# Patient Record
Sex: MALE | Race: WHITE
[De-identification: names, ages, dates, MRNs, and addresses within clinical notes are randomized per-mention and may not be internally consistent; named-entity substitution may affect disease eponyms.]

---

## 2022-07-19 ENCOUNTER — HOSPITAL ENCOUNTER (EMERGENCY)
Dept: HOSPITAL 26 - MED | Age: 72
Discharge: HOME | End: 2022-07-19
Payer: COMMERCIAL

## 2022-07-19 VITALS — DIASTOLIC BLOOD PRESSURE: 72 MMHG | SYSTOLIC BLOOD PRESSURE: 162 MMHG

## 2022-07-19 VITALS — BODY MASS INDEX: 31.25 KG/M2 | HEIGHT: 69 IN | WEIGHT: 211 LBS
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
## 2022-07-19 DIAGNOSIS — M25.562: Primary | ICD-10-CM
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
## 2022-07-19 DIAGNOSIS — E11.9: ICD-10-CM
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
## 2023-01-20 ENCOUNTER — HOSPITAL ENCOUNTER (INPATIENT)
Dept: HOSPITAL 26 - MED | Age: 73
LOS: 7 days | Discharge: NURSING, REHAB OR LONG-TERM CARE | DRG: 521 | End: 2023-01-27
Attending: STUDENT IN AN ORGANIZED HEALTH CARE EDUCATION/TRAINING PROGRAM | Admitting: STUDENT IN AN ORGANIZED HEALTH CARE EDUCATION/TRAINING PROGRAM
Payer: COMMERCIAL

## 2023-01-20 VITALS — BODY MASS INDEX: 32.62 KG/M2 | WEIGHT: 203 LBS | HEIGHT: 66 IN

## 2023-01-20 VITALS — SYSTOLIC BLOOD PRESSURE: 160 MMHG | DIASTOLIC BLOOD PRESSURE: 90 MMHG

## 2023-01-20 VITALS — DIASTOLIC BLOOD PRESSURE: 59 MMHG | SYSTOLIC BLOOD PRESSURE: 162 MMHG

## 2023-01-20 DIAGNOSIS — J90: ICD-10-CM

## 2023-01-20 DIAGNOSIS — E83.51: ICD-10-CM

## 2023-01-20 DIAGNOSIS — I48.92: ICD-10-CM

## 2023-01-20 DIAGNOSIS — Z91.199: ICD-10-CM

## 2023-01-20 DIAGNOSIS — I25.10: ICD-10-CM

## 2023-01-20 DIAGNOSIS — I51.7: ICD-10-CM

## 2023-01-20 DIAGNOSIS — Z20.822: ICD-10-CM

## 2023-01-20 DIAGNOSIS — I25.5: ICD-10-CM

## 2023-01-20 DIAGNOSIS — J69.0: ICD-10-CM

## 2023-01-20 DIAGNOSIS — E11.22: ICD-10-CM

## 2023-01-20 DIAGNOSIS — E11.65: ICD-10-CM

## 2023-01-20 DIAGNOSIS — N17.9: ICD-10-CM

## 2023-01-20 DIAGNOSIS — Z95.1: ICD-10-CM

## 2023-01-20 DIAGNOSIS — N18.9: ICD-10-CM

## 2023-01-20 DIAGNOSIS — S72.041A: Primary | ICD-10-CM

## 2023-01-20 DIAGNOSIS — I12.9: ICD-10-CM

## 2023-01-20 DIAGNOSIS — E78.5: ICD-10-CM

## 2023-01-20 DIAGNOSIS — I16.1: ICD-10-CM

## 2023-01-20 DIAGNOSIS — I48.91: ICD-10-CM

## 2023-01-20 LAB
ALBUMIN FLD-MCNC: 4 G/DL (ref 3.4–5)
ANION GAP SERPL CALCULATED.3IONS-SCNC: 12.9 MMOL/L (ref 8–16)
AST SERPL-CCNC: 10 U/L (ref 15–37)
BASOPHILS # BLD AUTO: 0 K/UL (ref 0–0.22)
BASOPHILS NFR BLD AUTO: 0.3 % (ref 0–2)
BILIRUB SERPL-MCNC: 1.4 MG/DL (ref 0–1)
BUN SERPL-MCNC: 36 MG/DL (ref 7–18)
CHLORIDE SERPL-SCNC: 101 MMOL/L (ref 98–107)
CO2 SERPL-SCNC: 27.1 MMOL/L (ref 21–32)
CREAT SERPL-MCNC: 1.6 MG/DL (ref 0.6–1.3)
EOSINOPHIL # BLD AUTO: 0.6 K/UL (ref 0–0.4)
EOSINOPHIL NFR BLD AUTO: 7.1 % (ref 0–4)
ERYTHROCYTE [DISTWIDTH] IN BLOOD BY AUTOMATED COUNT: 14.9 % (ref 11.6–13.7)
GFR SERPL CREATININE-BSD FRML MDRD: 55 ML/MIN (ref 90–?)
GLUCOSE SERPL-MCNC: 210 MG/DL (ref 74–106)
HCT VFR BLD AUTO: 35.1 % (ref 36–52)
HGB BLD-MCNC: 12 G/DL (ref 12–18)
LYMPHOCYTES # BLD AUTO: 0.9 K/UL (ref 2–11.5)
LYMPHOCYTES NFR BLD AUTO: 11.2 % (ref 20.5–51.1)
MCH RBC QN AUTO: 31 PG (ref 27–31)
MCHC RBC AUTO-ENTMCNC: 34 G/DL (ref 33–37)
MCV RBC AUTO: 91 FL (ref 80–94)
MONOCYTES # BLD AUTO: 0.7 K/UL (ref 0.8–1)
MONOCYTES NFR BLD AUTO: 8.6 % (ref 1.7–9.3)
NEUTROPHILS # BLD AUTO: 5.8 K/UL (ref 1.8–7.7)
NEUTROPHILS NFR BLD AUTO: 72.8 % (ref 42.2–75.2)
PLATELET # BLD AUTO: 131 K/UL (ref 140–450)
POTASSIUM SERPL-SCNC: 4 MMOL/L (ref 3.5–5.1)
PROTHROMBIN TIME: 41 SECS (ref 10.8–13.4)
RBC # BLD AUTO: 3.85 MIL/UL (ref 4.2–6.1)
SODIUM SERPL-SCNC: 137 MMOL/L (ref 136–145)
WBC # BLD AUTO: 7.9 K/UL (ref 4.8–10.8)

## 2023-01-20 PROCEDURE — C1776 JOINT DEVICE (IMPLANTABLE): HCPCS

## 2023-01-20 RX ADMIN — INSULIN LISPRO PRN UNITS: 100 INJECTION, SOLUTION INTRAVENOUS; SUBCUTANEOUS at 16:51

## 2023-01-20 RX ADMIN — Medication SCH DEV: at 16:44

## 2023-01-20 RX ADMIN — INSULIN LISPRO PRN UNITS: 100 INJECTION, SOLUTION INTRAVENOUS; SUBCUTANEOUS at 11:53

## 2023-01-20 RX ADMIN — Medication SCH DEV: at 20:06

## 2023-01-20 RX ADMIN — SODIUM CHLORIDE PRN MG: 9 INJECTION, SOLUTION INTRAVENOUS at 19:51

## 2023-01-20 RX ADMIN — Medication SCH DEV: at 11:47

## 2023-01-20 RX ADMIN — SODIUM CHLORIDE PRN MG: 9 INJECTION, SOLUTION INTRAVENOUS at 14:19

## 2023-01-20 NOTE — NUR
RECEIVED REPORT FROM ER NURSER EDILSON FOR CONTINUITY OF CARE. PT STABLE ON TRANSPORT. A&OX4, 
SKIN INTACT, RA , NON AMBULATORY USED A WHEELCHAIR AT [facility] AND IS ON A REGULAR 
DIET. PT HAS A 20G IV IN HIS LAC THAT IS SALINE LOCKED. PT USES A URINAL. ALL SAFETY 
MEASURES IN PLACE WITH CALL LIGHT WITHIN REACH. WILL CONTINUE TO MONITOR.

## 2023-01-20 NOTE — NUR
RECEIVED REPORT FROM DAY SHIFT RN FOR CONTINUITY OF CARE. PT IS AWAKE AND ALERT. PT HAS BEEN 
UNCOOPERATIVE. DID NOT WANT TO BE CHANGED EARLIER IN DAY SHIFT. PT IS ON RA NOT IN ANY 
DISTRESS. PT HAS RIGHT AC 20 GAUGE. FOOD BY BEDSIDE. CALL LIGHT WITHIN REACH. WILL CONTINUE 
TO MONITOR PT.

## 2023-01-20 NOTE — NUR
Change of shift report given to AM shift nurse Nato PESRAUD. AM shift nurse Nato 
RN verbalized understanding of report, no further questions.

## 2023-01-20 NOTE — NUR
Pt continues to c/o of severe pain, Dr. Ash made aware. Adjustment to pain 
management dosage will be made per Dr. Ash.

## 2023-01-20 NOTE — NUR
PT WAS COMPLAINING OF PAIN ON RIGHT SIDE HIP 8/10. MORPHINE WAS GIVEN. NO OTHER COMPLAINS. 
WILL CONTINUE TO MONITOR THE PT.

## 2023-01-20 NOTE — NUR
Patient lying in bed, A/Ox4, chest rise and fall symmetrical, no s/s of 
distress.

-------------------------------------------------------------------------------

Addendum: 01/20/23 at 0718 by [email or web address]

-------------------------------------------------------------------------------

Patient lying in bed, A/Ox4, chest rise and fall symmetrical, no s/s of 
distress, patient on monitor.

## 2023-01-20 NOTE — NUR
PATIENT HAS BEEN SCREENED AND CATEGORIZED AS MODERATE NUTRITION RISK. PATIENT WILL BE SEEN 
WITHIN 3-5 DAYS OF ADMISSION.



1/23/231/25/23



REVIEWED BY ROCIO GOLDMAN RD

## 2023-01-20 NOTE — NUR
Patient will be admitted to care of Dr. Ash. Admited to Med/Surg.  Will go 
to room 107A. Belongings list completed.  Report to CORI Villa.

## 2023-01-20 NOTE — NUR
CHECKED ON PT AND ASKED IF HE WANTED TO BE CHANGED. PT REFUSED. PT IS STILL WEARING HOME 
CLOTHES AND REFUSES HOSPITAL GOWN. ASKED PT IF HE NEEDS ANYTHING. PT SAID NO. CALL LIGHT 
WITHIN REACH. WILL CONTINUE TO MONITOR THE PT.

## 2023-01-21 VITALS — SYSTOLIC BLOOD PRESSURE: 164 MMHG | DIASTOLIC BLOOD PRESSURE: 63 MMHG

## 2023-01-21 VITALS — SYSTOLIC BLOOD PRESSURE: 155 MMHG | DIASTOLIC BLOOD PRESSURE: 43 MMHG

## 2023-01-21 VITALS — DIASTOLIC BLOOD PRESSURE: 66 MMHG | SYSTOLIC BLOOD PRESSURE: 155 MMHG

## 2023-01-21 VITALS — DIASTOLIC BLOOD PRESSURE: 67 MMHG | SYSTOLIC BLOOD PRESSURE: 154 MMHG

## 2023-01-21 LAB
ANION GAP SERPL CALCULATED.3IONS-SCNC: 14.3 MMOL/L (ref 8–16)
BASOPHILS # BLD AUTO: 0.1 K/UL (ref 0–0.22)
BASOPHILS NFR BLD AUTO: 0.7 % (ref 0–2)
BUN SERPL-MCNC: 30 MG/DL (ref 7–18)
CHLORIDE SERPL-SCNC: 102 MMOL/L (ref 98–107)
CO2 SERPL-SCNC: 23.7 MMOL/L (ref 21–32)
CREAT SERPL-MCNC: 1.3 MG/DL (ref 0.6–1.3)
EOSINOPHIL # BLD AUTO: 0.7 K/UL (ref 0–0.4)
EOSINOPHIL NFR BLD AUTO: 5.6 % (ref 0–4)
ERYTHROCYTE [DISTWIDTH] IN BLOOD BY AUTOMATED COUNT: 14.8 % (ref 11.6–13.7)
GFR SERPL CREATININE-BSD FRML MDRD: 70 ML/MIN (ref 90–?)
GLUCOSE SERPL-MCNC: 238 MG/DL (ref 74–106)
HCT VFR BLD AUTO: 33.5 % (ref 36–52)
HGB BLD-MCNC: 11.9 G/DL (ref 12–18)
LYMPHOCYTES # BLD AUTO: 1.4 K/UL (ref 2–11.5)
LYMPHOCYTES NFR BLD AUTO: 12.3 % (ref 20.5–51.1)
MCH RBC QN AUTO: 31 PG (ref 27–31)
MCHC RBC AUTO-ENTMCNC: 36 G/DL (ref 33–37)
MCV RBC AUTO: 88.4 FL (ref 80–94)
MONOCYTES # BLD AUTO: 1.1 K/UL (ref 0.8–1)
MONOCYTES NFR BLD AUTO: 9.1 % (ref 1.7–9.3)
NEUTROPHILS # BLD AUTO: 8.4 K/UL (ref 1.8–7.7)
NEUTROPHILS NFR BLD AUTO: 72.3 % (ref 42.2–75.2)
PLATELET # BLD AUTO: 167 K/UL (ref 140–450)
POTASSIUM SERPL-SCNC: 4 MMOL/L (ref 3.5–5.1)
PROTHROMBIN TIME: 27.5 SECS (ref 10.8–13.4)
RBC # BLD AUTO: 3.79 MIL/UL (ref 4.2–6.1)
SODIUM SERPL-SCNC: 136 MMOL/L (ref 136–145)
WBC # BLD AUTO: 11.7 K/UL (ref 4.8–10.8)

## 2023-01-21 RX ADMIN — Medication SCH DEV: at 20:10

## 2023-01-21 RX ADMIN — Medication SCH DEV: at 16:50

## 2023-01-21 RX ADMIN — Medication SCH DEV: at 11:50

## 2023-01-21 RX ADMIN — INSULIN LISPRO PRN UNITS: 100 INJECTION, SOLUTION INTRAVENOUS; SUBCUTANEOUS at 16:51

## 2023-01-21 RX ADMIN — INSULIN LISPRO PRN UNITS: 100 INJECTION, SOLUTION INTRAVENOUS; SUBCUTANEOUS at 06:31

## 2023-01-21 RX ADMIN — SODIUM CHLORIDE PRN MG: 9 INJECTION, SOLUTION INTRAVENOUS at 15:58

## 2023-01-21 RX ADMIN — Medication SCH DEV: at 06:37

## 2023-01-21 RX ADMIN — INSULIN LISPRO PRN UNITS: 100 INJECTION, SOLUTION INTRAVENOUS; SUBCUTANEOUS at 11:53

## 2023-01-21 RX ADMIN — SODIUM CHLORIDE PRN MG: 9 INJECTION, SOLUTION INTRAVENOUS at 20:18

## 2023-01-21 RX ADMIN — INSULIN LISPRO PRN UNITS: 100 INJECTION, SOLUTION INTRAVENOUS; SUBCUTANEOUS at 20:14

## 2023-01-21 RX ADMIN — SODIUM CHLORIDE PRN MG: 9 INJECTION, SOLUTION INTRAVENOUS at 04:47

## 2023-01-21 RX ADMIN — ATORVASTATIN CALCIUM SCH MG: 20 TABLET, FILM COATED ORAL at 08:44

## 2023-01-21 RX ADMIN — SODIUM CHLORIDE PRN MG: 9 INJECTION, SOLUTION INTRAVENOUS at 09:35

## 2023-01-21 NOTE — NUR
SPOKE TO DR MANSFIELD. AS PER DR MANSFIELD, INR STILL HIGH. PROCEDURE POSSIBLE ON MONDAY ONCE INR GOES 
DOWN.

## 2023-01-21 NOTE — NUR
SCHEDULED MEDICATIONS GIVEN AS ORDERED. PRN MORPHINE GIVEN FOR 9/10 PAIN ON RIGHT HIP. BP 
155/66. SAFETY MEASURES IN PLACE. WILL CONTINUE TO MONITOR THE PATIENT.

## 2023-01-21 NOTE — NUR
OBSERVED PT. PT IS SLEEPING COMFORTABLY IN BED. PT IS NOT IN ANY RESPIRATORY DISTRESS. BED 
AT THE LOWEST POSITION. HEAD OF THE BED RAISED. WILL CONTINUE TO MONITOR THE PT.

## 2023-01-21 NOTE — NUR
ATTEMPTED TO DO MORNING CARE WITH CNA BUT PT REFUSED. PT DIDN'T WANT TO GET CLEANED AND 
CHANGED. PT STILL IN REGULAR CLOTHES, REFUSED TO WEAR GOWN. RISKS AND BENEFITS EXPLAINED. CORI MERRILL HELPED EXPLAINED. BUT PT YELLED AND ASKED ALL STAFF TO GO OUT OF HIS ROOM AND LEAVE 
HIM ALONE.

## 2023-01-21 NOTE — NUR
MORPHINE WAS GIVEN EARLIER FOR 10/10 PAIN. TRIED TO CHANGE PT AFTERWARDS BUT PT REFUSED TO 
BE CHANGED. PT YELLS NO AND PUSHES AWAY. PT STATES HE IS CLEAN EVEN THOUGH HE IS SOILED. HE 
SAYS TO TRY LATER.

## 2023-01-21 NOTE — NUR
SLIDING SCALE INSULIN ADMINISTERED FOR . PT STILL REFUSING TO GET CLEAN AND CHANGED. 
PT STATED "I'M NOT DIRTY, I DON'T NEED TO BE CLEANED." OFFERED TWICE. PT DOESN'T WANT TO GET 
TOUCHED. WENT BACK TO SLEEP.

## 2023-01-21 NOTE — NUR
RECEIVED PATIENT LYING ON THE BED, IS AWAKE, ALERT AND ORIENTED, ANSWERS QUESTIONS 
APPROPRIATELY. DENIES PAIN AT THIS TIME, BREATHING EVEN AND NON LABORED ON ROOM AIR. IV 
ACCESS SITE ON RIGHT AC, SALINE LOCK, PATENT, FLUSH WITHOUT DIFFICULTY. PATIENT REFUSED TO 
BE CHANGE INTO GOWN, DON'T WANT LINENS CHANGE. ALL SAFETY PRECAUTIONS IN PLACE.

## 2023-01-21 NOTE — NUR
RECEIVED REPORT FROM NIGHT SHIFT NURSE FRANKLIN FOR CONTINUITY OF CARE. PT SLEEPING, EASILY 
AROUSABLE BY VERBAL STIMULI. RESPIRATIONS EVEN AND UNLABORED ON RA. BED SOILED WITH URINE, 
AS PER NIGHT SHIFT NURSE PT HAD BEEN UNCOOPERATIVE AND DIDN'T WANT TO GET CHANGE. WILL 
ATTEMPT TO CLEAN AND DO MORNING CARE. CALL LIGHT WITHIN REACH. SAFETY PRECAUTIONS IN PLACE.

## 2023-01-21 NOTE — NUR
ENDORSED PT TO NIGHT SHIFT NURSE RANI FOR CONTINUITY OF CARE. ALL NEEDS MET THROUGHOUT SHIFT. 
PT IS IN STABLE CONDITION.

## 2023-01-22 VITALS — SYSTOLIC BLOOD PRESSURE: 140 MMHG | DIASTOLIC BLOOD PRESSURE: 80 MMHG

## 2023-01-22 VITALS — DIASTOLIC BLOOD PRESSURE: 55 MMHG | SYSTOLIC BLOOD PRESSURE: 127 MMHG

## 2023-01-22 VITALS — SYSTOLIC BLOOD PRESSURE: 113 MMHG | DIASTOLIC BLOOD PRESSURE: 57 MMHG

## 2023-01-22 LAB
ANION GAP SERPL CALCULATED.3IONS-SCNC: 10.6 MMOL/L (ref 8–16)
BASOPHILS # BLD AUTO: 0 K/UL (ref 0–0.22)
BASOPHILS NFR BLD AUTO: 0.3 % (ref 0–2)
BUN SERPL-MCNC: 30 MG/DL (ref 7–18)
CHLORIDE SERPL-SCNC: 101 MMOL/L (ref 98–107)
CO2 SERPL-SCNC: 29.1 MMOL/L (ref 21–32)
CREAT SERPL-MCNC: 1.5 MG/DL (ref 0.6–1.3)
EOSINOPHIL # BLD AUTO: 0.4 K/UL (ref 0–0.4)
EOSINOPHIL NFR BLD AUTO: 4.3 % (ref 0–4)
ERYTHROCYTE [DISTWIDTH] IN BLOOD BY AUTOMATED COUNT: 14.7 % (ref 11.6–13.7)
GFR SERPL CREATININE-BSD FRML MDRD: 60 ML/MIN (ref 90–?)
GLUCOSE SERPL-MCNC: 212 MG/DL (ref 74–106)
HCT VFR BLD AUTO: 35.1 % (ref 36–52)
HGB BLD-MCNC: 11.9 G/DL (ref 12–18)
LYMPHOCYTES # BLD AUTO: 1 K/UL (ref 2–11.5)
LYMPHOCYTES NFR BLD AUTO: 10.7 % (ref 20.5–51.1)
MCH RBC QN AUTO: 31 PG (ref 27–31)
MCHC RBC AUTO-ENTMCNC: 34 G/DL (ref 33–37)
MCV RBC AUTO: 91.5 FL (ref 80–94)
MONOCYTES # BLD AUTO: 1 K/UL (ref 0.8–1)
MONOCYTES NFR BLD AUTO: 10.8 % (ref 1.7–9.3)
NEUTROPHILS # BLD AUTO: 6.9 K/UL (ref 1.8–7.7)
NEUTROPHILS NFR BLD AUTO: 73.9 % (ref 42.2–75.2)
PLATELET # BLD AUTO: 152 K/UL (ref 140–450)
POTASSIUM SERPL-SCNC: 4.7 MMOL/L (ref 3.5–5.1)
PROTHROMBIN TIME: 19.3 SECS (ref 10.8–13.4)
RBC # BLD AUTO: 3.84 MIL/UL (ref 4.2–6.1)
SODIUM SERPL-SCNC: 136 MMOL/L (ref 136–145)
WBC # BLD AUTO: 9.4 K/UL (ref 4.8–10.8)

## 2023-01-22 RX ADMIN — ATORVASTATIN CALCIUM SCH MG: 20 TABLET, FILM COATED ORAL at 09:01

## 2023-01-22 RX ADMIN — INSULIN LISPRO PRN UNITS: 100 INJECTION, SOLUTION INTRAVENOUS; SUBCUTANEOUS at 12:10

## 2023-01-22 RX ADMIN — Medication SCH DEV: at 20:53

## 2023-01-22 RX ADMIN — Medication SCH DEV: at 12:09

## 2023-01-22 RX ADMIN — SODIUM CHLORIDE PRN MG: 9 INJECTION, SOLUTION INTRAVENOUS at 13:14

## 2023-01-22 RX ADMIN — ENOXAPARIN SODIUM SCH MG: 100 INJECTION SUBCUTANEOUS at 18:30

## 2023-01-22 RX ADMIN — Medication SCH DEV: at 16:42

## 2023-01-22 RX ADMIN — INSULIN LISPRO PRN UNITS: 100 INJECTION, SOLUTION INTRAVENOUS; SUBCUTANEOUS at 20:56

## 2023-01-22 RX ADMIN — Medication SCH DEV: at 06:33

## 2023-01-22 RX ADMIN — SODIUM CHLORIDE PRN MG: 9 INJECTION, SOLUTION INTRAVENOUS at 23:24

## 2023-01-22 RX ADMIN — SODIUM CHLORIDE PRN MG: 9 INJECTION, SOLUTION INTRAVENOUS at 04:58

## 2023-01-22 RX ADMIN — SODIUM CHLORIDE PRN MG: 9 INJECTION, SOLUTION INTRAVENOUS at 17:14

## 2023-01-22 RX ADMIN — SODIUM CHLORIDE PRN MG: 9 INJECTION, SOLUTION INTRAVENOUS at 09:03

## 2023-01-22 RX ADMIN — INSULIN LISPRO PRN UNITS: 100 INJECTION, SOLUTION INTRAVENOUS; SUBCUTANEOUS at 16:59

## 2023-01-22 NOTE — NUR
PATIENT IS ASLEEP, BREATHING EVEN AND NON LABORED ON ROOM AIR, NO SIGNS OF PAIN NOTED. CALL 
LIGHT WITHIN REACH, BED IN LOW AND LOCKED POSITION.

## 2023-01-22 NOTE — NUR
PATIENT C/O 9/10 PAIN ON RIGHT HIP, PRN MORPHINE GIVEN AS ORDERED, /80. PATIENT 
REFUSED TO LET THIS STAFF AND PRIMARY CNA  DO MORNING CARE STATED" I AM OKAY, I AM 
COMFORTABLE, I DON'T NEED TO BE CHANGE". ALL SAFETY MEASURES IN PLACE.

## 2023-01-22 NOTE — NUR
RECEIVED PATIENT LYING ON THE BED, NO SIGNS OF DISTRESS NOTED, IV ACCESS SITE ON RIGHT AC, 
PATENT AND INTACT. CALL LIGHT WITHIN REACH.

## 2023-01-22 NOTE — NUR
ENDORSE PATIENT IN STABLE CONDITION TO PM SHIFT NURSE. MOST RECENT MORPHINE GIVEN AT 1718 
VIA RAC SALINE LOCK.  PATIENT IS NPO AFTER MIDNIGHT PER DR. DONAL JOHNSON

## 2023-01-23 VITALS — SYSTOLIC BLOOD PRESSURE: 159 MMHG | DIASTOLIC BLOOD PRESSURE: 72 MMHG

## 2023-01-23 VITALS — SYSTOLIC BLOOD PRESSURE: 132 MMHG | DIASTOLIC BLOOD PRESSURE: 62 MMHG

## 2023-01-23 LAB
ANION GAP SERPL CALCULATED.3IONS-SCNC: 11.8 MMOL/L (ref 8–16)
BASOPHILS # BLD AUTO: 0.1 K/UL (ref 0–0.22)
BASOPHILS NFR BLD AUTO: 0.7 % (ref 0–2)
BUN SERPL-MCNC: 28 MG/DL (ref 7–18)
CHLORIDE SERPL-SCNC: 99 MMOL/L (ref 98–107)
CO2 SERPL-SCNC: 27 MMOL/L (ref 21–32)
CREAT SERPL-MCNC: 1.4 MG/DL (ref 0.6–1.3)
EOSINOPHIL # BLD AUTO: 0.4 K/UL (ref 0–0.4)
EOSINOPHIL NFR BLD AUTO: 5 % (ref 0–4)
ERYTHROCYTE [DISTWIDTH] IN BLOOD BY AUTOMATED COUNT: 14.8 % (ref 11.6–13.7)
GFR SERPL CREATININE-BSD FRML MDRD: 64 ML/MIN (ref 90–?)
GLUCOSE SERPL-MCNC: 189 MG/DL (ref 74–106)
HCT VFR BLD AUTO: 33.7 % (ref 36–52)
HGB BLD-MCNC: 11.5 G/DL (ref 12–18)
LYMPHOCYTES # BLD AUTO: 1.5 K/UL (ref 2–11.5)
LYMPHOCYTES NFR BLD AUTO: 17.2 % (ref 20.5–51.1)
MCH RBC QN AUTO: 31 PG (ref 27–31)
MCHC RBC AUTO-ENTMCNC: 34 G/DL (ref 33–37)
MCV RBC AUTO: 91 FL (ref 80–94)
MONOCYTES # BLD AUTO: 1.1 K/UL (ref 0.8–1)
MONOCYTES NFR BLD AUTO: 12.2 % (ref 1.7–9.3)
NEUTROPHILS # BLD AUTO: 5.7 K/UL (ref 1.8–7.7)
NEUTROPHILS NFR BLD AUTO: 64.9 % (ref 42.2–75.2)
PLATELET # BLD AUTO: 168 K/UL (ref 140–450)
POTASSIUM SERPL-SCNC: 3.8 MMOL/L (ref 3.5–5.1)
PROTHROMBIN TIME: 16.9 SECS (ref 10.8–13.4)
RBC # BLD AUTO: 3.7 MIL/UL (ref 4.2–6.1)
SODIUM SERPL-SCNC: 134 MMOL/L (ref 136–145)
WBC # BLD AUTO: 8.7 K/UL (ref 4.8–10.8)

## 2023-01-23 RX ADMIN — Medication SCH DEV: at 16:56

## 2023-01-23 RX ADMIN — SODIUM CHLORIDE PRN MG: 9 INJECTION, SOLUTION INTRAVENOUS at 13:36

## 2023-01-23 RX ADMIN — INSULIN LISPRO PRN UNITS: 100 INJECTION, SOLUTION INTRAVENOUS; SUBCUTANEOUS at 16:58

## 2023-01-23 RX ADMIN — ENOXAPARIN SODIUM SCH MG: 100 INJECTION SUBCUTANEOUS at 09:00

## 2023-01-23 RX ADMIN — INSULIN LISPRO PRN UNITS: 100 INJECTION, SOLUTION INTRAVENOUS; SUBCUTANEOUS at 06:40

## 2023-01-23 RX ADMIN — INSULIN LISPRO PRN UNITS: 100 INJECTION, SOLUTION INTRAVENOUS; SUBCUTANEOUS at 23:25

## 2023-01-23 RX ADMIN — ATORVASTATIN CALCIUM SCH MG: 20 TABLET, FILM COATED ORAL at 09:36

## 2023-01-23 RX ADMIN — SODIUM CHLORIDE PRN MG: 9 INJECTION, SOLUTION INTRAVENOUS at 09:38

## 2023-01-23 RX ADMIN — SODIUM CHLORIDE PRN MG: 9 INJECTION, SOLUTION INTRAVENOUS at 23:06

## 2023-01-23 RX ADMIN — SODIUM CHLORIDE PRN MG: 9 INJECTION, SOLUTION INTRAVENOUS at 18:53

## 2023-01-23 RX ADMIN — Medication SCH DEV: at 06:39

## 2023-01-23 RX ADMIN — SODIUM CHLORIDE PRN MG: 9 INJECTION, SOLUTION INTRAVENOUS at 05:38

## 2023-01-23 RX ADMIN — Medication SCH DEV: at 11:39

## 2023-01-23 RX ADMIN — INSULIN LISPRO PRN UNITS: 100 INJECTION, SOLUTION INTRAVENOUS; SUBCUTANEOUS at 11:43

## 2023-01-23 RX ADMIN — Medication SCH DEV: at 23:17

## 2023-01-23 NOTE — NUR
ORDER FOR PTT @0600. PATIENT IS NPO AFTER MIDNIGHT. INFORMED PATIENT, PT VERBALIZED 
UNDERSTANDING. SAFETY MEASURES IN PLACE.

## 2023-01-23 NOTE — NUR
ENDORSE PATIENT IN STABLE CONDITION TO PM SHIFT NURSE. MOST RECENT MORPHINE GIVEN AT 1852 
VIA RAC SALINE LOCK.  PATIENT IS NPO AFTER MIDNIGHT FOR TOMORROW'S ORIF

## 2023-01-23 NOTE — NUR
MORPHINE 4MG GIVEN AS ORDERED AT 2306. ALMOST IMMEDIATELY AFTER RECEIVING DOSE PT RAISED HIS 
VOICE AND SAID "WHERE'S THE MORPHINE?" AND ACCUSED NURSE OF NOT GIVING HIM HIS MORPHINE. BY 
THIS CURRENT TIME PT CONTINUED TO ACCUSE NURSE OF NOT GIVING HIM HIS MORPHINE. PT POINTED TO 
HIS HEAD AND SAID: "I'M NOT GOING CRAZY. I'M SMART. I'M A . I GRADUATED FROM [employer] 
[employer]." PT WAS REMINDED THAT MORPHINE TAKE TIME TO START WORKING.

## 2023-01-23 NOTE — NUR
ENDORSED PATIENT TO DAY NURSE FOR CONTINUITY OF CARE. NEEDS MET THROUGHOUT THE SHIFT. 
PATIENT IN STABLE CONDITION.

## 2023-01-23 NOTE — NUR
PRN MORPHINE GIVEN, PATIENT C/O PAIN ON RIGHT HIP. /74. PATIENT STILL REFUSING BEDSIDE 
CARE. ALL SAFETY MEASURES IN PLACE.

## 2023-01-24 VITALS — SYSTOLIC BLOOD PRESSURE: 183 MMHG | DIASTOLIC BLOOD PRESSURE: 45 MMHG

## 2023-01-24 VITALS — SYSTOLIC BLOOD PRESSURE: 132 MMHG | DIASTOLIC BLOOD PRESSURE: 60 MMHG

## 2023-01-24 VITALS — DIASTOLIC BLOOD PRESSURE: 41 MMHG | SYSTOLIC BLOOD PRESSURE: 169 MMHG

## 2023-01-24 VITALS — SYSTOLIC BLOOD PRESSURE: 156 MMHG | DIASTOLIC BLOOD PRESSURE: 38 MMHG

## 2023-01-24 VITALS — DIASTOLIC BLOOD PRESSURE: 47 MMHG | SYSTOLIC BLOOD PRESSURE: 177 MMHG

## 2023-01-24 VITALS — DIASTOLIC BLOOD PRESSURE: 45 MMHG | SYSTOLIC BLOOD PRESSURE: 170 MMHG

## 2023-01-24 VITALS — SYSTOLIC BLOOD PRESSURE: 150 MMHG | DIASTOLIC BLOOD PRESSURE: 37 MMHG

## 2023-01-24 VITALS — DIASTOLIC BLOOD PRESSURE: 35 MMHG | SYSTOLIC BLOOD PRESSURE: 169 MMHG

## 2023-01-24 LAB
ANION GAP SERPL CALCULATED.3IONS-SCNC: 13.3 MMOL/L (ref 8–16)
BASOPHILS # BLD AUTO: 0.1 K/UL (ref 0–0.22)
BASOPHILS NFR BLD AUTO: 0.7 % (ref 0–2)
BUN SERPL-MCNC: 30 MG/DL (ref 7–18)
CHLORIDE SERPL-SCNC: 100 MMOL/L (ref 98–107)
CO2 SERPL-SCNC: 23.8 MMOL/L (ref 21–32)
CREAT SERPL-MCNC: 1.3 MG/DL (ref 0.6–1.3)
EOSINOPHIL # BLD AUTO: 0.3 K/UL (ref 0–0.4)
EOSINOPHIL NFR BLD AUTO: 3.5 % (ref 0–4)
ERYTHROCYTE [DISTWIDTH] IN BLOOD BY AUTOMATED COUNT: 14.5 % (ref 11.6–13.7)
GFR SERPL CREATININE-BSD FRML MDRD: 70 ML/MIN (ref 90–?)
GLUCOSE SERPL-MCNC: 188 MG/DL (ref 74–106)
HCT VFR BLD AUTO: 31.8 % (ref 36–52)
HGB BLD-MCNC: 11 G/DL (ref 12–18)
LYMPHOCYTES # BLD AUTO: 1.5 K/UL (ref 2–11.5)
LYMPHOCYTES NFR BLD AUTO: 15.7 % (ref 20.5–51.1)
MCH RBC QN AUTO: 31 PG (ref 27–31)
MCHC RBC AUTO-ENTMCNC: 35 G/DL (ref 33–37)
MCV RBC AUTO: 89.8 FL (ref 80–94)
MONOCYTES # BLD AUTO: 1.3 K/UL (ref 0.8–1)
MONOCYTES NFR BLD AUTO: 13.8 % (ref 1.7–9.3)
NEUTROPHILS # BLD AUTO: 6.4 K/UL (ref 1.8–7.7)
NEUTROPHILS NFR BLD AUTO: 66.3 % (ref 42.2–75.2)
PLATELET # BLD AUTO: 187 K/UL (ref 140–450)
POTASSIUM SERPL-SCNC: 4.1 MMOL/L (ref 3.5–5.1)
PROTHROMBIN TIME: 13.7 SECS (ref 10.8–13.4)
RBC # BLD AUTO: 3.54 MIL/UL (ref 4.2–6.1)
SODIUM SERPL-SCNC: 133 MMOL/L (ref 136–145)
WBC # BLD AUTO: 9.7 K/UL (ref 4.8–10.8)

## 2023-01-24 PROCEDURE — 0SRR0JZ REPLACEMENT OF RIGHT HIP JOINT, FEMORAL SURFACE WITH SYNTHETIC SUBSTITUTE, OPEN APPROACH: ICD-10-PCS

## 2023-01-24 RX ADMIN — SODIUM CHLORIDE PRN MG: 9 INJECTION, SOLUTION INTRAVENOUS at 22:12

## 2023-01-24 RX ADMIN — INSULIN LISPRO PRN UNITS: 100 INJECTION, SOLUTION INTRAVENOUS; SUBCUTANEOUS at 17:06

## 2023-01-24 RX ADMIN — HYDROMORPHONE HYDROCHLORIDE PRN MG: 1 INJECTION, SOLUTION INTRAMUSCULAR; INTRAVENOUS; SUBCUTANEOUS at 18:34

## 2023-01-24 RX ADMIN — HYDROMORPHONE HYDROCHLORIDE PRN MG: 1 INJECTION, SOLUTION INTRAMUSCULAR; INTRAVENOUS; SUBCUTANEOUS at 13:22

## 2023-01-24 RX ADMIN — Medication SCH DEV: at 21:58

## 2023-01-24 RX ADMIN — SODIUM CHLORIDE PRN MG: 9 INJECTION, SOLUTION INTRAVENOUS at 16:36

## 2023-01-24 RX ADMIN — ENOXAPARIN SODIUM SCH MG: 100 INJECTION SUBCUTANEOUS at 21:55

## 2023-01-24 RX ADMIN — ATORVASTATIN CALCIUM SCH MG: 20 TABLET, FILM COATED ORAL at 09:00

## 2023-01-24 RX ADMIN — Medication SCH DEV: at 16:30

## 2023-01-24 RX ADMIN — SODIUM CHLORIDE PRN MG: 9 INJECTION, SOLUTION INTRAVENOUS at 04:28

## 2023-01-24 RX ADMIN — Medication SCH DEV: at 11:30

## 2023-01-24 RX ADMIN — INSULIN LISPRO PRN UNITS: 100 INJECTION, SOLUTION INTRAVENOUS; SUBCUTANEOUS at 22:00

## 2023-01-24 RX ADMIN — Medication SCH DEV: at 07:33

## 2023-01-24 NOTE — NUR
DC PLANNING 



LATE ENTRY PT SEEN ON 1/23/23 AT 1030AM



JOSUE MET WITH PT AND PTS SISTER MARIO LEMUS AT BEDSIDE TO COMPLETE 

ASSESSMENT. PT REPORTS RESIDING AT [facility] SINCE JUNE 22.' PT 

IDENTIFIED MARIO LEMUS, SISTER, [phone] AS EMERGENCY CONTACT. MARIO 

REPORTS BEING PTS ONLY RELATIVE AND REQUESTING SW TO SIGN [employer] DIRECTIVE. JOSUE SPOKE TO PT AND MARIO AND INFORMED THEM BOTH THAT 

SW  IS NOT A NOTARY AND A NOTARY MUST BE CONTACTED, MARIO REPORTS SHE 

"WILL NOT PAY FOR A NOTARY TO TRAVEL." MARIO REQUESTING SW SIGN AS 

WITNESS, INFORMED BOTH PT AND MARIO UNABLE TO SIGN AS WITNESS. MARIO 

VERBALIZED UNDERSTANDING. MARIO REPORTS SHE BELIEVES AD IN PLACE WITH JESSICA DOWNING, JOSUE ENCOURAGED MARIO TO CONTACT JESSICA SALINA FOR COPY. MARIO REPORTS 

SHE IS NOT GOING TO CALL [employer]IC[employer]IQUEL DOWNING BC SHE IS "TOO STRESSED TO HANDLE ANY 

OF THIS". JOSUE  REPORTS SHE WILL CONTACT [facility] TOO INQUIRE ON AD IN PLACE 

HOWEVER, WAS DOING SO AS A COURTESY. JOSUE ENCOURAGED PT OR MARIO TO CALL 

[employer] AND REQUEST COPY OF AD, WHEN ABLE TO DO SO. PT IS REPORTED TO BE 

MEDICATION COMPLIANT AND HAS MEDICATION DELIVERED TO ASSISTED LIVING 

FACILITY. AT BASELINE PT IS REPORTED TO UTILIZE FWW, W/C AND IS COMPLETES 

ADL'S INDEPENDENTLY. PT HAS HX OF DIABETES THAT IS REPORTED TO BE WELL 

MANAGED. TENTATIVE DC PLAN IS FOR PT TO RETURN TO  WHEN MEDICALLY STABLE 

UNLESS RECOMMENDED OTHERWISE BY PHYSICIAN. 



1/24/23 1325 OUTREACHED TO [facility]OL[facility]AN [phone] TO INQUIRE IF ADV ON FILE HOWEVER, 
NO ANSWER. MESSAGE LEFT REQUESTING A RETURN PHONE CALL. 

-------------------------------------------------------------------------------

Addendum: 01/24/23 at 1340 by Raciel GUTIERREZ

-------------------------------------------------------------------------------

Amended: Links added.

## 2023-01-24 NOTE — NUR
PT TRANSFERRED VIA BED FROM OR. PT AWAKE AND ALERT. A LINE TO RT WRIST, IV TO RT AC 20 G. 
ROOM AIR. VS STABLE.

## 2023-01-24 NOTE — NUR
ATTEMPTED TO SEE PATIENT FOR PHYSICAL THERAPY EVALUATION HOWEVER PATIENT NOT APPROPRIATE PER 
CHARGE RN. RECENTLY RECEIVED TO ICU S/P SURGERY. WILL FOLLOW UP TOMORROW IF APPROPRIATE.

## 2023-01-24 NOTE — NUR
PT WAS PICKED UP BY SURGERY FOR HIS PROCEDURE. PT STABLE. MORNING MEDICATION WAS NOT GIVEN 
DUE TO PT BEING NPO.

## 2023-01-24 NOTE — NUR
RECEIVED PT. FROM DAY SHIFT RNKIERA. PT. WIDE AWAKE, ALERT WITH PERIOD OF CONFUSION. PT. 
ABLE TO EXPRESS NEEDS. PT. S/P RIGHT HIP ARMANDO-ARTHROPLASTY, DRESSING INTACT. BILATERAL LUNGS 
SOUNDS RONCHI. PT. ON ROOM AIR, O2 %. HR 60, ATRIAL FIBRILLATION WITH VENTRICULAR 
PACED RHYTHM ON THE MONITOR. IV TO RIGHT AC 20G, NO S/S OF INFILTRATION, CAPPED AND FLUSHED. 
WITH RIGHT WRIST ATRIAL LINE INTACT. ON A CARDIAC DIET AS ORDERED. DOCKERY CATHETER TO GRAVITY 
WITH CLEAR KAVITHA COLOR URINE. MAKE ALL NEEDS KNOWN. NO S/S OF PAIN AT THIS TIME. WILL CONT. 
TO MONITOR.

## 2023-01-24 NOTE — NUR
PT C/O PAIN 9/10. PT MEDICATED (W WITNESS PRESENT AND REMINDING PT THAT HE IS RECEIVING 
MORPHINE 4 MG) W MORPHINE 4 MG AS ORDERED FOR PAIN MANAGEMENT.

## 2023-01-24 NOTE — NUR
RECEIVED REPORT VIA PHONE FROM ARLENE PERSAUD. PT ALERT AND ORIENTED. ROOM AIR. PACEMAKER IN 
PLACE. NPO.  URINAL IN USE. GENERALIZED WEAKNESS. SKIN INTACT. IV TO RT AC 20 G, CLAMPED.

## 2023-01-24 NOTE — NUR
1/24/23 RD INITIAL ASSESSMENT COMPLETED



PLEASE REFER TO NUTRITION ASSESSMENT UNDER CARE ACTIVITY FOR ESTIMATED NUTRITIONAL NEEDS. 



1. RECOMMEND ADDING CCHO 60GM TO CARDIAC DIET

2. RECOMMEND GLUCERNA 1/DAY TO HELP INCREASE PO INTAKE

- PROVIDES 220 KCAL AND 10 GM PROTEIN DAILY

3. MONITOR PO INTAKE, GI SYMPTOMS, AND NUTRITION RELATED LAB VALUES

4. RD TO FOLLOW-UP 3-5 DAYS, MODERATE RISK 



REVIEWED BY ROCIO GOLDMAN RD

## 2023-01-24 NOTE — NUR
PT REQUESTING PAIN MEDICATION AGAIN. WITH WITNESS PRESENT, PT WAS GIVEN NORCO (MORPHINE NOT 
YET DUE). PT REPEATED SEVERAL TIMES WHAT MEDICATION HE WAS RECEIVING FOR PAIN. PT TOOK 
MEDICATION W/O INCIDENT.

## 2023-01-25 VITALS — DIASTOLIC BLOOD PRESSURE: 38 MMHG | SYSTOLIC BLOOD PRESSURE: 158 MMHG

## 2023-01-25 VITALS — SYSTOLIC BLOOD PRESSURE: 154 MMHG | DIASTOLIC BLOOD PRESSURE: 35 MMHG

## 2023-01-25 VITALS — DIASTOLIC BLOOD PRESSURE: 37 MMHG | SYSTOLIC BLOOD PRESSURE: 156 MMHG

## 2023-01-25 VITALS — SYSTOLIC BLOOD PRESSURE: 152 MMHG | DIASTOLIC BLOOD PRESSURE: 31 MMHG

## 2023-01-25 VITALS — DIASTOLIC BLOOD PRESSURE: 41 MMHG | SYSTOLIC BLOOD PRESSURE: 154 MMHG

## 2023-01-25 VITALS — DIASTOLIC BLOOD PRESSURE: 59 MMHG | SYSTOLIC BLOOD PRESSURE: 158 MMHG

## 2023-01-25 VITALS — DIASTOLIC BLOOD PRESSURE: 41 MMHG | SYSTOLIC BLOOD PRESSURE: 157 MMHG

## 2023-01-25 VITALS — SYSTOLIC BLOOD PRESSURE: 163 MMHG | DIASTOLIC BLOOD PRESSURE: 37 MMHG

## 2023-01-25 VITALS — DIASTOLIC BLOOD PRESSURE: 50 MMHG | SYSTOLIC BLOOD PRESSURE: 145 MMHG

## 2023-01-25 VITALS — DIASTOLIC BLOOD PRESSURE: 48 MMHG | SYSTOLIC BLOOD PRESSURE: 145 MMHG

## 2023-01-25 VITALS — SYSTOLIC BLOOD PRESSURE: 159 MMHG | DIASTOLIC BLOOD PRESSURE: 60 MMHG

## 2023-01-25 LAB
ANION GAP SERPL CALCULATED.3IONS-SCNC: 13.8 MMOL/L (ref 8–16)
BASOPHILS # BLD AUTO: 0 K/UL (ref 0–0.22)
BASOPHILS NFR BLD AUTO: 0.4 % (ref 0–2)
BUN SERPL-MCNC: 29 MG/DL (ref 7–18)
CHLORIDE SERPL-SCNC: 106 MMOL/L (ref 98–107)
CO2 SERPL-SCNC: 20.2 MMOL/L (ref 21–32)
CREAT SERPL-MCNC: 1.3 MG/DL (ref 0.6–1.3)
EOSINOPHIL # BLD AUTO: 0 K/UL (ref 0–0.4)
EOSINOPHIL NFR BLD AUTO: 0.4 % (ref 0–4)
ERYTHROCYTE [DISTWIDTH] IN BLOOD BY AUTOMATED COUNT: 14.6 % (ref 11.6–13.7)
GFR SERPL CREATININE-BSD FRML MDRD: 70 ML/MIN (ref 90–?)
GLUCOSE SERPL-MCNC: 242 MG/DL (ref 74–106)
HCT VFR BLD AUTO: 26 % (ref 36–52)
HGB BLD-MCNC: 8.8 G/DL (ref 12–18)
LYMPHOCYTES # BLD AUTO: 0.6 K/UL (ref 2–11.5)
LYMPHOCYTES NFR BLD AUTO: 5.7 % (ref 20.5–51.1)
MCH RBC QN AUTO: 31 PG (ref 27–31)
MCHC RBC AUTO-ENTMCNC: 34 G/DL (ref 33–37)
MCV RBC AUTO: 90.8 FL (ref 80–94)
MONOCYTES # BLD AUTO: 1.4 K/UL (ref 0.8–1)
MONOCYTES NFR BLD AUTO: 12.6 % (ref 1.7–9.3)
NEUTROPHILS # BLD AUTO: 9.2 K/UL (ref 1.8–7.7)
NEUTROPHILS NFR BLD AUTO: 80.9 % (ref 42.2–75.2)
PLATELET # BLD AUTO: 155 K/UL (ref 140–450)
POTASSIUM SERPL-SCNC: 4 MMOL/L (ref 3.5–5.1)
RBC # BLD AUTO: 2.87 MIL/UL (ref 4.2–6.1)
SODIUM SERPL-SCNC: 136 MMOL/L (ref 136–145)
WBC # BLD AUTO: 11.4 K/UL (ref 4.8–10.8)

## 2023-01-25 RX ADMIN — Medication SCH DEV: at 20:37

## 2023-01-25 RX ADMIN — DEXTROSE SCH MLS/HR: 50 INJECTION, SOLUTION INTRAVENOUS at 12:24

## 2023-01-25 RX ADMIN — ENOXAPARIN SODIUM SCH MG: 100 INJECTION SUBCUTANEOUS at 09:20

## 2023-01-25 RX ADMIN — SODIUM CHLORIDE PRN MG: 9 INJECTION, SOLUTION INTRAVENOUS at 18:24

## 2023-01-25 RX ADMIN — Medication SCH DEV: at 07:41

## 2023-01-25 RX ADMIN — ATORVASTATIN CALCIUM SCH MG: 20 TABLET, FILM COATED ORAL at 08:09

## 2023-01-25 RX ADMIN — INSULIN LISPRO PRN UNITS: 100 INJECTION, SOLUTION INTRAVENOUS; SUBCUTANEOUS at 16:08

## 2023-01-25 RX ADMIN — DEXTROSE SCH MLS/HR: 50 INJECTION, SOLUTION INTRAVENOUS at 20:31

## 2023-01-25 RX ADMIN — ATORVASTATIN CALCIUM SCH MG: 20 TABLET, FILM COATED ORAL at 08:04

## 2023-01-25 RX ADMIN — Medication SCH DEV: at 16:28

## 2023-01-25 RX ADMIN — SODIUM CHLORIDE PRN MG: 9 INJECTION, SOLUTION INTRAVENOUS at 09:50

## 2023-01-25 RX ADMIN — SODIUM CHLORIDE PRN MG: 9 INJECTION, SOLUTION INTRAVENOUS at 05:00

## 2023-01-25 RX ADMIN — INSULIN LISPRO PRN UNITS: 100 INJECTION, SOLUTION INTRAVENOUS; SUBCUTANEOUS at 11:23

## 2023-01-25 RX ADMIN — INSULIN LISPRO PRN UNITS: 100 INJECTION, SOLUTION INTRAVENOUS; SUBCUTANEOUS at 20:38

## 2023-01-25 RX ADMIN — ENOXAPARIN SODIUM SCH MG: 100 INJECTION SUBCUTANEOUS at 20:18

## 2023-01-25 RX ADMIN — Medication SCH DEV: at 11:23

## 2023-01-25 RX ADMIN — INSULIN LISPRO PRN UNITS: 100 INJECTION, SOLUTION INTRAVENOUS; SUBCUTANEOUS at 07:43

## 2023-01-25 NOTE — NUR
PATIENT IN BED RESTING WATCHING TV ON ROOM AIR. NO S/S OF RESPIRATORY DISTRESS. RESPIRATION 
REGULAR NON LABORED. NO COMPLAINTS OF PAIN AT THIS TIME. IV ACCESS ON THE RAC 20 GAUGE 
SALINE LOCK. CALL LIGHT WITHIN REACH. SAFETY MEASURES IN PLACE.

## 2023-01-25 NOTE — NUR
Dr. Bravo at bedside examining patient. New orders received. 

-------------------------------------------------------------------------------

Addendum: 01/25/23 at 1354 by Azra Mcfadden RN

-------------------------------------------------------------------------------

Per jorge l Pool to discontinue A-line if blood pressure is correlating. Blood pressure 
cuff on left arm and correlating with A-line blood pressure.

## 2023-01-25 NOTE — NUR
Received pt awake and alert. No signs of respiratory distress on room air. Pt with pacemaker 
on left upper chest with paced rhythm. Abd soft and nondistended. Pryor catheter intact and 
draining to gravity. Peripheral IV 20 gauge on right AC intact and infusing NS@2ml/hr. 
Arterial line on right wrist flushed. Safety precautions in place.

## 2023-01-26 VITALS — SYSTOLIC BLOOD PRESSURE: 121 MMHG | DIASTOLIC BLOOD PRESSURE: 53 MMHG

## 2023-01-26 VITALS — DIASTOLIC BLOOD PRESSURE: 52 MMHG | SYSTOLIC BLOOD PRESSURE: 146 MMHG

## 2023-01-26 VITALS — SYSTOLIC BLOOD PRESSURE: 118 MMHG | DIASTOLIC BLOOD PRESSURE: 53 MMHG

## 2023-01-26 VITALS — DIASTOLIC BLOOD PRESSURE: 56 MMHG | SYSTOLIC BLOOD PRESSURE: 108 MMHG

## 2023-01-26 VITALS — DIASTOLIC BLOOD PRESSURE: 66 MMHG | SYSTOLIC BLOOD PRESSURE: 117 MMHG

## 2023-01-26 VITALS — DIASTOLIC BLOOD PRESSURE: 48 MMHG | SYSTOLIC BLOOD PRESSURE: 134 MMHG

## 2023-01-26 VITALS — DIASTOLIC BLOOD PRESSURE: 53 MMHG | SYSTOLIC BLOOD PRESSURE: 118 MMHG

## 2023-01-26 RX ADMIN — Medication SCH DEV: at 20:21

## 2023-01-26 RX ADMIN — ATORVASTATIN CALCIUM SCH MG: 20 TABLET, FILM COATED ORAL at 09:58

## 2023-01-26 RX ADMIN — ENOXAPARIN SODIUM SCH MG: 100 INJECTION SUBCUTANEOUS at 10:01

## 2023-01-26 RX ADMIN — ENOXAPARIN SODIUM SCH MG: 100 INJECTION SUBCUTANEOUS at 20:37

## 2023-01-26 RX ADMIN — SODIUM CHLORIDE PRN MG: 9 INJECTION, SOLUTION INTRAVENOUS at 20:44

## 2023-01-26 RX ADMIN — Medication SCH DEV: at 11:53

## 2023-01-26 RX ADMIN — Medication SCH DEV: at 16:33

## 2023-01-26 RX ADMIN — INSULIN LISPRO PRN UNITS: 100 INJECTION, SOLUTION INTRAVENOUS; SUBCUTANEOUS at 11:55

## 2023-01-26 RX ADMIN — INSULIN LISPRO PRN UNITS: 100 INJECTION, SOLUTION INTRAVENOUS; SUBCUTANEOUS at 20:35

## 2023-01-26 RX ADMIN — Medication SCH DEV: at 06:39

## 2023-01-26 RX ADMIN — DEXTROSE SCH MLS/HR: 50 INJECTION, SOLUTION INTRAVENOUS at 13:49

## 2023-01-26 RX ADMIN — INSULIN LISPRO PRN UNITS: 100 INJECTION, SOLUTION INTRAVENOUS; SUBCUTANEOUS at 16:35

## 2023-01-26 RX ADMIN — DEXTROSE SCH MLS/HR: 50 INJECTION, SOLUTION INTRAVENOUS at 20:28

## 2023-01-26 RX ADMIN — DEXTROSE SCH MLS/HR: 50 INJECTION, SOLUTION INTRAVENOUS at 04:50

## 2023-01-26 RX ADMIN — SODIUM CHLORIDE PRN MG: 9 INJECTION, SOLUTION INTRAVENOUS at 05:49

## 2023-01-26 RX ADMIN — SODIUM CHLORIDE PRN MG: 9 INJECTION, SOLUTION INTRAVENOUS at 09:59

## 2023-01-26 RX ADMIN — SODIUM CHLORIDE PRN MG: 9 INJECTION, SOLUTION INTRAVENOUS at 15:40

## 2023-01-26 RX ADMIN — INSULIN LISPRO PRN UNITS: 100 INJECTION, SOLUTION INTRAVENOUS; SUBCUTANEOUS at 06:39

## 2023-01-26 NOTE — NUR
ENDORSE PATIENT IN STABLE CONDITION TO PM SHIFT NURSE WHILE PIV SITE AT [facility] PATENT 20G 
SALINE LOCK.  PATIENT IS DISCHARGING TO SNF.  PLACEMENT PENDING

## 2023-01-26 NOTE — NUR
ENDORSED PATIENT FROM NIGHT SHIFT NURSE.PATIENT IS SLEEPING.HE IS AWAKE AND ORIENTED.POC 
DISCUSSED.WILL CONTINUE TO MONITOR

## 2023-01-26 NOTE — NUR
DC PLANNING:

[facility] THE ORDER TO [facility]  CALLED FRIEDA LEMUS AT  [employer]  [phone] LEFT A 
MESSAGE. FAXED TO [facility] CM TO FOLLOW 

-------------------------------------------------------------------------------

Addendum: 01/27/23 at 1211 by Lindsey Bonilla RN

-------------------------------------------------------------------------------

DC PLANNING:

RECEIVED A CALL FROM [employer] ALLAN SPOKE WITH KANWAL QUICK  [facility] IS NOT CONTRACTED WITH 
[employer]. SHE PROVIDE THE AUTH FOR SNF AND TRANSPORT [id number]  [facility]EX[facility] ACCEPTED PATIENT 
CAN GO TO ROOM 21B # TO GIVE REPORT  ARRANGED TRANSPORT WITH [facility] 
UP TIME BETWEEN 1:30 TO 2PM NOTIFIED ESPERANZA RIDDLE CM TO FOLLOW

## 2023-01-26 NOTE — NUR
SCHEDULED MEDICATIONS GIVEN AS ORDERED. PRN MORPHINE GIVEN FOR C/0 9/10 PAIN ON RIGHT HIP. 
RIGHT HIP SURGICAL INCISION , STAPLES INTACT. WILL CONTINUE TO MONITOR THE PATIENT.

## 2023-01-26 NOTE — NUR
DISCHARGE PLANNING 

ATTEMPTED TO CALL CASE MANGER FROM [employer] AT [phone] TO CONFIRM THAT SHE 
RECEIVED PATIENT'S CLINICAL PACKAGE SEND TO HER PREVIOUSLY BY [facility]  YUDELKA SEND TO 
HER  01/25/2023. NO RESPONSE TO THE SEVERAL CALLS 5X MADE BY JOSUE. JOSUE WAS NOT ABLE TO LEAVE A 
VOICE MAIL DUE TO VOICE MAIL BEEN FULL. 

-------------------------------------------------------------------------------

Addendum: 01/26/23 at 1301 by Katy John 

-------------------------------------------------------------------------------

JOSUE DISCUSSED WITH SUPERVISOR A. B. THE LACK OF ABILITY TO COMMUNICATE WITH [employer] CASE 
MANGER LOVENIA, AND THE SEVERAL ATTEMPTS MADE TO CALL HER AND GET IN CONTACT WITH HER BUT NO 
RESPONSE AND NO ACCESS TO HER VOICE MAIL. SUPERVISOR KATALINA PROVIDED ALTERNATIVE NUMBER TO 
[employer] MAIN NUMBER KANWAL [phone]. JOSUE WILL FOLLOW UP.   

-------------------------------------------------------------------------------

Addendum: 01/26/23 at 1312 by Katy John 

-------------------------------------------------------------------------------

JOSUE ATTEMPTED TO CALL KANWAL AT [employer] MAIN LINE [phone] TO CONFIRM  IF PATIENT'S 
CLINICAL PACKET THAT WAS SEND BY [facility] ALLAN JHA EARLIER WAS RECEIVED AND TO VERIFY IF [employer] 
INS. CONTRACTS WITH [facility] VILLA BASHIR TO OBTAIN [facility] APPROVAL.  KANWAL DID NOT RESPONDED TO CALL 
AND JOSUE LEFT HER A DETAIL MESSAGE WITH SW CONTACT.   SW WILL FOLLOW UP AS NEEDED.  

-------------------------------------------------------------------------------

Addendum: 01/26/23 at 1328 by Katy GUTIERREZ

-------------------------------------------------------------------------------

KANWAL FROM [employer] [phone] CALL BACK JOSUE TO DISCUSS PATIENT INFORMATION AND CONTRACT 
WITH [employer]. PER KANWAL CASE YOLANDA.  [employer] DO NOT CONTRACT WITH [facility].  JOSUE 
ASK HER TO SEND A LIST OF ALL SNF/FACILITIES [employer] IS CONTACTED WITH AND ALSO TRANSPORT 
COMPANIES [employer] CONTRACTS WITH TO FACILITATE THE PLACEMENT OF PATIENT.  KANWAL AGREED AND 
REQUEST FAX NUMBER.  JOSUE PROVIDED AND THANKED KANWAL FOR THE CALL JOSUE/CM WILL FOLLOW UP AS 
NEEDED. 

-------------------------------------------------------------------------------

Addendum: 01/26/23 at 1341 by Katy GUTIERREZ

-------------------------------------------------------------------------------

DISCHARGE PLANNING 

 FAXED PATIENT'S CLINICAL PACKET TO [facility] AT [phone] WITH COMPLETED CONFIRMATION 
AT ABOUT 13:40 SW WILL FOLLOW UP AS NEEDED. 

-------------------------------------------------------------------------------

Addendum: 01/26/23 at 1940 by Katy John 

-------------------------------------------------------------------------------



SW FAXED PATIENT'S CLINICAL PACKET TO [facility]LEXANDRA [facility]ASH[facility] WITH COMPLETED CONFIRMATION AT ABOUT 
16:08 SW WILL FOLLOW UP AS NEEDED. 



JOSUE FAXED PATIENT'S CLINICAL PACKET TO [facility] WITH COMPLETED CONFIRMATION AT ABOUT 
16:12 SW WILL FOLLOW UP AS NEEDED.

## 2023-01-27 VITALS — SYSTOLIC BLOOD PRESSURE: 132 MMHG | DIASTOLIC BLOOD PRESSURE: 62 MMHG

## 2023-01-27 VITALS — DIASTOLIC BLOOD PRESSURE: 57 MMHG | SYSTOLIC BLOOD PRESSURE: 136 MMHG

## 2023-01-27 VITALS — SYSTOLIC BLOOD PRESSURE: 143 MMHG | DIASTOLIC BLOOD PRESSURE: 66 MMHG

## 2023-01-27 VITALS — DIASTOLIC BLOOD PRESSURE: 65 MMHG | SYSTOLIC BLOOD PRESSURE: 149 MMHG

## 2023-01-27 VITALS — SYSTOLIC BLOOD PRESSURE: 149 MMHG | DIASTOLIC BLOOD PRESSURE: 65 MMHG

## 2023-01-27 RX ADMIN — ENOXAPARIN SODIUM SCH MG: 100 INJECTION SUBCUTANEOUS at 08:55

## 2023-01-27 RX ADMIN — SODIUM CHLORIDE PRN MG: 9 INJECTION, SOLUTION INTRAVENOUS at 02:16

## 2023-01-27 RX ADMIN — INSULIN LISPRO PRN UNITS: 100 INJECTION, SOLUTION INTRAVENOUS; SUBCUTANEOUS at 06:33

## 2023-01-27 RX ADMIN — SODIUM CHLORIDE PRN MG: 9 INJECTION, SOLUTION INTRAVENOUS at 09:18

## 2023-01-27 RX ADMIN — Medication SCH DEV: at 11:48

## 2023-01-27 RX ADMIN — INSULIN LISPRO PRN UNITS: 100 INJECTION, SOLUTION INTRAVENOUS; SUBCUTANEOUS at 11:52

## 2023-01-27 RX ADMIN — Medication SCH DEV: at 06:32

## 2023-01-27 RX ADMIN — ATORVASTATIN CALCIUM SCH MG: 20 TABLET, FILM COATED ORAL at 08:53

## 2023-01-27 RX ADMIN — DEXTROSE SCH MLS/HR: 50 INJECTION, SOLUTION INTRAVENOUS at 05:21

## 2023-01-27 RX ADMIN — DEXTROSE SCH MLS/HR: 50 INJECTION, SOLUTION INTRAVENOUS at 12:39

## 2023-01-27 NOTE — NUR
PRN MORPHINE GIVEN, PATIENT C/O 10/10 PAIN ON RIGHT HIP. /56. CALL LIGHT WITHIN REACH. 
SAFETY MEASURES IN PLACE.

## 2023-01-27 NOTE — NUR
ENDORSED PATIENT OT DAY NURSE FOR CONTINUITY OF CARE. NEEDS MET THROUGHOUT THE SHIFT. 
PATIENT IS IN STABLE CONDITION.

## 2023-01-27 NOTE — NUR
PT DC TO [facility]. PT PICKED UP BY TRANSPORT STAFF AND TRANSPORTED VIA GURNEY. TELE 
MONITOR REMOVED. PT LEFT WITH FC, INTACT, DRAINING WELL AND IV LINE ON RAC 22G FOR ABX TO BE 
CONTINUED AT [facility]. ALL BELONGINGS TAKEN UPON DC. PT IS IN STABLE CONDITION.

## 2023-01-27 NOTE — NUR
RECEIVED REPORT FROM NIGHT SHIFT NURSE RANI FOR CONTINUITY OF CARE. PT SLEEPING, EASILY 
AROUSABLE BY VERBAL STIMULI. RESPIRATIONS EVEN AND UNLABORED ON RA. ON CARDIAC MONITOR. 
DOCKERY CATHETER IN PLACE DRAINING TO GRAVITY. NOTED SURGICAL WOUND STAPLED JUAN WITH NO 
DRAINAGE ON RIGHT HIP. CALL LIGHT WITHIN REACH. INITIAL ASSESSMENT DONE. POC 
DISCUSSED.SAFETY PRECAUTIONS IN PLACE.

## 2023-01-27 NOTE — NUR
PT FOR DC TO [facility]. PICK-UP TIME 1:30-2PM. PT MADE AWARE. DC PAPERS DISCUSSED 
WITH THE PT. PT VERBALIZED UNDERSTANDING. KEEP FC AS PER DR MOSQUERA. CLEAN AND CHANGED PT DIAPER 
WITH CNA. PT TOLERATED WELL. PT COMFORTABLY LYING IN BED. NO DISTRESS NOTED. AWAITING FOR 
PICK-UP.

## 2023-01-27 NOTE — NUR
SLIDING SCALE INSULIN ADMINISTERED FOR . PT COMPLAINING OF HIP PAIN 10/10. WILL INFORM 
CORI WALDROP.

## 2024-09-27 ENCOUNTER — HOSPITAL ENCOUNTER (EMERGENCY)
Dept: HOSPITAL 26 - MED | Age: 74
LOS: 1 days | Discharge: HOME | End: 2024-09-28
Payer: COMMERCIAL

## 2024-09-27 VITALS
HEART RATE: 96 BPM | TEMPERATURE: 98.8 F | OXYGEN SATURATION: 96 % | SYSTOLIC BLOOD PRESSURE: 164 MMHG | DIASTOLIC BLOOD PRESSURE: 70 MMHG | RESPIRATION RATE: 18 BRPM

## 2024-09-27 VITALS — HEIGHT: 69 IN | BODY MASS INDEX: 23.7 KG/M2 | WEIGHT: 160 LBS

## 2024-09-27 DIAGNOSIS — E11.65: Primary | ICD-10-CM

## 2024-09-27 DIAGNOSIS — R60.0: ICD-10-CM

## 2024-09-27 DIAGNOSIS — Z79.4: ICD-10-CM

## 2024-09-27 DIAGNOSIS — Z95.0: ICD-10-CM

## 2024-09-27 DIAGNOSIS — I11.0: ICD-10-CM

## 2024-09-27 DIAGNOSIS — Z79.899: ICD-10-CM

## 2024-09-27 DIAGNOSIS — I50.9: ICD-10-CM

## 2024-09-27 DIAGNOSIS — Z79.01: ICD-10-CM

## 2024-09-27 LAB
ANION GAP SERPL CALCULATED.3IONS-SCNC: 9.5 MMOL/L (ref 8–16)
BASOPHILS # BLD AUTO: 0.1 K/UL (ref 0–0.22)
BASOPHILS NFR BLD AUTO: 0.8 % (ref 0–2)
BUN SERPL-MCNC: 34 MG/DL (ref 7–18)
CALCIUM SPEC-MCNC: 9.1 MG/DL (ref 8.5–10.1)
CHLORIDE SERPL-SCNC: 95 MMOL/L (ref 98–107)
CO2 SERPL-SCNC: 29 MMOL/L (ref 21–32)
CREAT SERPL-MCNC: 2.1 MG/DL (ref 0.6–1.3)
EOSINOPHIL # BLD AUTO: 0.2 K/UL (ref 0–0.4)
EOSINOPHIL NFR BLD AUTO: 2.3 % (ref 0–4)
ERYTHROCYTE [DISTWIDTH] IN BLOOD BY AUTOMATED COUNT: 16.1 % (ref 11.6–13.7)
GFR SERPL CREATININE-BSD FRML MDRD: (no result) ML/MIN (ref 90–?)
GLUCOSE SERPL-MCNC: 541 MG/DL (ref 74–106)
HCT VFR BLD AUTO: 34.4 % (ref 36–52)
HGB BLD-MCNC: 11.9 G/DL (ref 12–18)
LYMPHOCYTES # BLD AUTO: 1.6 K/UL (ref 2–11.5)
LYMPHOCYTES NFR BLD AUTO: 16.3 % (ref 20.5–51.1)
MCH RBC QN AUTO: 31 PG (ref 27–31)
MCHC RBC AUTO-ENTMCNC: 34 G/DL (ref 33–37)
MCV RBC AUTO: 90.7 FL (ref 80–94)
MONOCYTES # BLD AUTO: 0.6 K/UL (ref 0.8–1)
MONOCYTES NFR BLD AUTO: 6.2 % (ref 1.7–9.3)
NEUTROPHILS # BLD AUTO: 7.2 K/UL (ref 1.8–7.7)
NEUTROPHILS NFR BLD AUTO: 74.4 % (ref 42.2–75.2)
PLATELET # BLD AUTO: 184 K/UL (ref 140–450)
POTASSIUM SERPL-SCNC: 3.5 MMOL/L (ref 3.5–5.1)
RBC # BLD AUTO: 3.79 MIL/UL (ref 4.2–6.1)
SODIUM SERPL-SCNC: 130 MMOL/L (ref 136–145)
WBC # BLD AUTO: 9.7 K/UL (ref 4.8–10.8)

## 2024-09-27 PROCEDURE — 85025 COMPLETE CBC W/AUTO DIFF WBC: CPT

## 2024-09-27 PROCEDURE — 82948 REAGENT STRIP/BLOOD GLUCOSE: CPT

## 2024-09-27 PROCEDURE — 80048 BASIC METABOLIC PNL TOTAL CA: CPT

## 2024-09-27 PROCEDURE — 99285 EMERGENCY DEPT VISIT HI MDM: CPT

## 2024-09-27 PROCEDURE — 36415 COLL VENOUS BLD VENIPUNCTURE: CPT

## 2024-09-27 PROCEDURE — 96372 THER/PROPH/DIAG INJ SC/IM: CPT

## 2024-09-27 PROCEDURE — 93970 EXTREMITY STUDY: CPT

## 2024-09-27 RX ADMIN — INSULIN LISPRO ONE UNITS: 100 INJECTION, SOLUTION INTRAVENOUS; SUBCUTANEOUS at 23:37

## 2024-09-27 RX ADMIN — HYDROCODONE BITARTRATE AND ACETAMINOPHEN ONE TAB: 5; 325 TABLET ORAL at 22:54

## 2024-09-28 VITALS
DIASTOLIC BLOOD PRESSURE: 60 MMHG | RESPIRATION RATE: 16 BRPM | OXYGEN SATURATION: 96 % | SYSTOLIC BLOOD PRESSURE: 155 MMHG | HEART RATE: 58 BPM

## 2024-09-28 VITALS — TEMPERATURE: 98.8 F

## 2024-09-28 RX ADMIN — GUAIFENESIN AND DEXTROMETHORPHAN ONE ML: 100; 10 SYRUP ORAL at 04:14

## 2024-10-19 ENCOUNTER — HOSPITAL ENCOUNTER (EMERGENCY)
Dept: HOSPITAL 26 - MED | Age: 74
LOS: 1 days | Discharge: NURSING, REHAB OR LONG-TERM CARE | End: 2024-10-20
Payer: COMMERCIAL

## 2024-10-19 VITALS
SYSTOLIC BLOOD PRESSURE: 190 MMHG | RESPIRATION RATE: 16 BRPM | HEART RATE: 72 BPM | OXYGEN SATURATION: 97 % | TEMPERATURE: 97.2 F | DIASTOLIC BLOOD PRESSURE: 89 MMHG

## 2024-10-19 VITALS — BODY MASS INDEX: 22.36 KG/M2 | WEIGHT: 151 LBS | HEIGHT: 69 IN

## 2024-10-19 DIAGNOSIS — Z79.01: ICD-10-CM

## 2024-10-19 DIAGNOSIS — I50.9: ICD-10-CM

## 2024-10-19 DIAGNOSIS — Z95.0: ICD-10-CM

## 2024-10-19 DIAGNOSIS — Z79.899: ICD-10-CM

## 2024-10-19 DIAGNOSIS — I11.0: ICD-10-CM

## 2024-10-19 DIAGNOSIS — E11.65: Primary | ICD-10-CM

## 2024-10-19 LAB
BASOPHILS # BLD AUTO: 0.1 K/UL (ref 0–0.22)
BASOPHILS NFR BLD AUTO: 0.7 % (ref 0–2)
EOSINOPHIL # BLD AUTO: 0.3 K/UL (ref 0–0.4)
EOSINOPHIL NFR BLD AUTO: 2.6 % (ref 0–4)
ERYTHROCYTE [DISTWIDTH] IN BLOOD BY AUTOMATED COUNT: 16 % (ref 11.6–13.7)
HCT VFR BLD AUTO: 34.2 % (ref 36–52)
HGB BLD-MCNC: 11.5 G/DL (ref 12–18)
LYMPHOCYTES # BLD AUTO: 1.5 K/UL (ref 2–11.5)
LYMPHOCYTES NFR BLD AUTO: 13.6 % (ref 20.5–51.1)
MCH RBC QN AUTO: 31 PG (ref 27–31)
MCHC RBC AUTO-ENTMCNC: 34 G/DL (ref 33–37)
MCV RBC AUTO: 93.2 FL (ref 80–94)
MONOCYTES # BLD AUTO: 0.9 K/UL (ref 0.8–1)
MONOCYTES NFR BLD AUTO: 8.7 % (ref 1.7–9.3)
NEUTROPHILS # BLD AUTO: 8 K/UL (ref 1.8–7.7)
NEUTROPHILS NFR BLD AUTO: 74.4 % (ref 42.2–75.2)
PLATELET # BLD AUTO: 224 K/UL (ref 140–450)
RBC # BLD AUTO: 3.66 MIL/UL (ref 4.2–6.1)
WBC # BLD AUTO: 10.8 K/UL (ref 4.8–10.8)

## 2024-10-19 PROCEDURE — 82948 REAGENT STRIP/BLOOD GLUCOSE: CPT

## 2024-10-19 PROCEDURE — 99283 EMERGENCY DEPT VISIT LOW MDM: CPT

## 2024-10-19 PROCEDURE — 36415 COLL VENOUS BLD VENIPUNCTURE: CPT

## 2024-10-19 PROCEDURE — 80048 BASIC METABOLIC PNL TOTAL CA: CPT

## 2024-10-19 PROCEDURE — 85025 COMPLETE CBC W/AUTO DIFF WBC: CPT

## 2024-10-19 PROCEDURE — 96361 HYDRATE IV INFUSION ADD-ON: CPT

## 2024-10-19 PROCEDURE — 96374 THER/PROPH/DIAG INJ IV PUSH: CPT

## 2024-10-19 RX ADMIN — SODIUM CHLORIDE ONE MLS/HR: 9 INJECTION, SOLUTION INTRAVENOUS at 23:55

## 2024-10-20 VITALS
DIASTOLIC BLOOD PRESSURE: 89 MMHG | HEART RATE: 72 BPM | OXYGEN SATURATION: 97 % | RESPIRATION RATE: 16 BRPM | TEMPERATURE: 97.2 F | SYSTOLIC BLOOD PRESSURE: 190 MMHG

## 2024-10-20 LAB
ANION GAP SERPL CALCULATED.3IONS-SCNC: 10.1 MMOL/L (ref 8–16)
BUN SERPL-MCNC: 25 MG/DL (ref 7–18)
CALCIUM SPEC-MCNC: 9.2 MG/DL (ref 8.5–10.1)
CHLORIDE SERPL-SCNC: 96 MMOL/L (ref 98–107)
CO2 SERPL-SCNC: 28.6 MMOL/L (ref 21–32)
CREAT SERPL-MCNC: 1.7 MG/DL (ref 0.6–1.3)
GFR SERPL CREATININE-BSD FRML MDRD: (no result) ML/MIN (ref 90–?)
GLUCOSE SERPL-MCNC: 384 MG/DL (ref 74–106)
POTASSIUM SERPL-SCNC: 4.7 MMOL/L (ref 3.5–5.1)
SODIUM SERPL-SCNC: 130 MMOL/L (ref 136–145)

## 2024-10-20 RX ADMIN — INSULIN HUMAN ONE UNIT: 100 INJECTION, SOLUTION PARENTERAL at 00:29
